# Patient Record
(demographics unavailable — no encounter records)

---

## 2025-05-08 NOTE — IMAGING
[de-identified] : Examination of the left knee mild medial knee swelling compared to the right.  No ecchymosis, no erythema.  Skin is intact.  Patient is able to straight leg raise.  She is able to actively flex and extend the knee, she has some pain to terminal extension.  She has tenderness along the medial femoral condyle.  No tenderness along the medial or lateral joint line.  No tenderness over the lateral femoral condyle.  No tenderness over the proximal tibia.  No tenderness over the patella, patella tendon or quadricep tendon.  She felt stable to stress testing.  She is able to bear weight and ambulate.  X-rays bilateral knee 3 views in the office today for comparison purposes no fracture or dislocation

## 2025-05-08 NOTE — ASSESSMENT
[FreeTextEntry1] : Impression is left knee contusion medial femoral condyle.  Recommending knee immobilizer.  Rest from gym class and sports.  Icing, anti-inflammatory.  Follow-up with Dr. Noyola in a few weeks for repeat evaluation

## 2025-05-08 NOTE — HISTORY OF PRESENT ILLNESS
[de-identified] : 8-year-old female comes in today with her mom for an evaluation of her left knee pain and injury that occurred on May 7.  Patient fell outside on the playground onto the left knee.

## 2025-05-28 NOTE — HISTORY OF PRESENT ILLNESS
[FreeTextEntry1] : 7 y/o female here today with contusion of left knee medial femoral condyle after a fall at the playground 2 weeks ago. Patient was last seen by JEAN CLAUDE Long and was advised to obtain a knee immobilizer.